# Patient Record
Sex: FEMALE | Race: WHITE | URBAN - METROPOLITAN AREA
[De-identification: names, ages, dates, MRNs, and addresses within clinical notes are randomized per-mention and may not be internally consistent; named-entity substitution may affect disease eponyms.]

---

## 2017-09-13 ENCOUNTER — OFFICE VISIT (OUTPATIENT)
Dept: FAMILY MEDICINE CLINIC | Facility: CLINIC | Age: 27
End: 2017-09-13

## 2017-09-13 ENCOUNTER — APPOINTMENT (OUTPATIENT)
Dept: LAB | Age: 27
End: 2017-09-13
Attending: NURSE PRACTITIONER
Payer: COMMERCIAL

## 2017-09-13 VITALS
TEMPERATURE: 98 F | BODY MASS INDEX: 28.44 KG/M2 | OXYGEN SATURATION: 98 % | DIASTOLIC BLOOD PRESSURE: 82 MMHG | SYSTOLIC BLOOD PRESSURE: 128 MMHG | HEART RATE: 77 BPM | WEIGHT: 130 LBS | HEIGHT: 56.5 IN

## 2017-09-13 DIAGNOSIS — Z23 NEED FOR PROPHYLACTIC VACCINATION AND INOCULATION AGAINST INFLUENZA: ICD-10-CM

## 2017-09-13 DIAGNOSIS — Z00.00 WELLNESS EXAMINATION: Primary | ICD-10-CM

## 2017-09-13 DIAGNOSIS — Z83.3 FAMILY HISTORY OF DIABETES MELLITUS IN FATHER: ICD-10-CM

## 2017-09-13 DIAGNOSIS — R00.2 PALPITATION: ICD-10-CM

## 2017-09-13 DIAGNOSIS — Z13.0 SCREENING FOR IRON DEFICIENCY ANEMIA: ICD-10-CM

## 2017-09-13 DIAGNOSIS — Z13.29 SCREENING FOR THYROID DISORDER: ICD-10-CM

## 2017-09-13 LAB
25-HYDROXYVITAMIN D (TOTAL): 37.8 NG/ML (ref 30–100)
ALBUMIN SERPL-MCNC: 3.8 G/DL (ref 3.5–4.8)
ALP LIVER SERPL-CCNC: 36 U/L (ref 37–98)
ALT SERPL-CCNC: 19 U/L (ref 14–54)
AST SERPL-CCNC: 13 U/L (ref 15–41)
BASOPHILS # BLD AUTO: 0.03 X10(3) UL (ref 0–0.1)
BASOPHILS NFR BLD AUTO: 0.7 %
BILIRUB SERPL-MCNC: 0.5 MG/DL (ref 0.1–2)
BUN BLD-MCNC: 11 MG/DL (ref 8–20)
CALCIUM BLD-MCNC: 9 MG/DL (ref 8.3–10.3)
CHLORIDE: 106 MMOL/L (ref 101–111)
CO2: 29 MMOL/L (ref 22–32)
CREAT BLD-MCNC: 0.85 MG/DL (ref 0.55–1.02)
DEPRECATED HBV CORE AB SER IA-ACNC: 3.4 NG/ML (ref 10–291)
EOSINOPHIL # BLD AUTO: 0.22 X10(3) UL (ref 0–0.3)
EOSINOPHIL NFR BLD AUTO: 5.4 %
ERYTHROCYTE [DISTWIDTH] IN BLOOD BY AUTOMATED COUNT: 13.1 % (ref 11.5–16)
GLUCOSE BLD-MCNC: 97 MG/DL (ref 70–99)
HCT VFR BLD AUTO: 39.3 % (ref 34–50)
HGB BLD-MCNC: 12.5 G/DL (ref 12–16)
IMMATURE GRANULOCYTE COUNT: 0 X10(3) UL (ref 0–1)
IMMATURE GRANULOCYTE RATIO %: 0 %
IRON SATURATION: 10 % (ref 13–45)
IRON: 57 UG/DL (ref 28–170)
LYMPHOCYTES # BLD AUTO: 1.49 X10(3) UL (ref 0.9–4)
LYMPHOCYTES NFR BLD AUTO: 36.7 %
M PROTEIN MFR SERPL ELPH: 7.8 G/DL (ref 6.1–8.3)
MCH RBC QN AUTO: 29.5 PG (ref 27–33.2)
MCHC RBC AUTO-ENTMCNC: 31.8 G/DL (ref 31–37)
MCV RBC AUTO: 92.7 FL (ref 81–100)
MONOCYTES # BLD AUTO: 0.42 X10(3) UL (ref 0.1–0.6)
MONOCYTES NFR BLD AUTO: 10.3 %
NEUTROPHIL ABS PRELIM: 1.9 X10 (3) UL (ref 1.3–6.7)
NEUTROPHILS # BLD AUTO: 1.9 X10(3) UL (ref 1.3–6.7)
NEUTROPHILS NFR BLD AUTO: 46.9 %
PLATELET # BLD AUTO: 250 10(3)UL (ref 150–450)
POTASSIUM SERPL-SCNC: 3.5 MMOL/L (ref 3.6–5.1)
RBC # BLD AUTO: 4.24 X10(6)UL (ref 3.8–5.1)
RED CELL DISTRIBUTION WIDTH-SD: 44 FL (ref 35.1–46.3)
SODIUM SERPL-SCNC: 142 MMOL/L (ref 136–144)
TOTAL IRON BINDING CAPACITY: 580 UG/DL (ref 298–536)
TRANSFERRIN: 389 MG/DL (ref 200–360)
TSI SER-ACNC: 0.65 MIU/ML (ref 0.35–5.5)
WBC # BLD AUTO: 4.1 X10(3) UL (ref 4–13)

## 2017-09-13 PROCEDURE — 99395 PREV VISIT EST AGE 18-39: CPT | Performed by: NURSE PRACTITIONER

## 2017-09-13 PROCEDURE — 83540 ASSAY OF IRON: CPT | Performed by: NURSE PRACTITIONER

## 2017-09-13 PROCEDURE — 82728 ASSAY OF FERRITIN: CPT | Performed by: NURSE PRACTITIONER

## 2017-09-13 PROCEDURE — 36415 COLL VENOUS BLD VENIPUNCTURE: CPT | Performed by: NURSE PRACTITIONER

## 2017-09-13 PROCEDURE — 93000 ELECTROCARDIOGRAM COMPLETE: CPT | Performed by: NURSE PRACTITIONER

## 2017-09-13 PROCEDURE — 90471 IMMUNIZATION ADMIN: CPT | Performed by: NURSE PRACTITIONER

## 2017-09-13 PROCEDURE — 83550 IRON BINDING TEST: CPT | Performed by: NURSE PRACTITIONER

## 2017-09-13 PROCEDURE — 80050 GENERAL HEALTH PANEL: CPT | Performed by: NURSE PRACTITIONER

## 2017-09-13 PROCEDURE — 82306 VITAMIN D 25 HYDROXY: CPT | Performed by: NURSE PRACTITIONER

## 2017-09-13 PROCEDURE — 90686 IIV4 VACC NO PRSV 0.5 ML IM: CPT | Performed by: NURSE PRACTITIONER

## 2017-09-13 PROCEDURE — 86480 TB TEST CELL IMMUN MEASURE: CPT | Performed by: NURSE PRACTITIONER

## 2017-09-13 RX ORDER — ETONOGESTREL AND ETHINYL ESTRADIOL 11.7; 2.7 MG/1; MG/1
INSERT, EXTENDED RELEASE VAGINAL
COMMUNITY

## 2017-09-13 NOTE — PROGRESS NOTES
HPI:    Patient ID: Burak Wilson is a 27year old female. HPI     Present for annual exam. Is concerned that a few weeks ago she noted that she had some palpitations with exercise. Not drinking much caffeine.  Concerned because her dad is diabetic an intact distal pulses. Pulmonary/Chest: Effort normal and breath sounds normal.   Abdominal: Soft. Bowel sounds are normal. There is no hepatosplenomegaly. There is no CVA tenderness.    Genitourinary:   Genitourinary Comments: Sees gyne   Musculoskeletal

## 2017-09-13 NOTE — PATIENT INSTRUCTIONS
EKG - no acute findings. Flu vaccine today. Labs, including testing for TB, pending. If labs normal and symptoms persist, recommend stress test.   Otherwise follow-up as needed.

## 2017-09-17 LAB
M TB TUBERC IFN-G BLD QL: NEGATIVE
M TB TUBERC IFN-G/MITOGEN IGNF BLD: <0 IU/ML
M TB TUBERC IGNF/MITOGEN IGNF CONTROL: >10 IU/ML
MITOGEN IGNF BCKGRD COR BLD-ACNC: 0.08 IU/ML

## 2017-09-18 ENCOUNTER — TELEPHONE (OUTPATIENT)
Dept: FAMILY MEDICINE CLINIC | Facility: CLINIC | Age: 27
End: 2017-09-18

## 2017-09-18 DIAGNOSIS — E87.6 HYPOKALEMIA: ICD-10-CM

## 2017-09-18 DIAGNOSIS — E61.1 IRON DEFICIENCY: Primary | ICD-10-CM

## 2017-09-18 NOTE — TELEPHONE ENCOUNTER
Patient informed of the below results and recommendations. Patient will sign up for my chart for results. She will also c/b to schedule a lab appt in 3 months. Orders pended. Please advise.

## 2017-09-18 NOTE — TELEPHONE ENCOUNTER
----- Message from MANOHAR Magdaleno sent at 9/18/2017  8:42 AM CDT -----  Please let patient know that her TB is negative.   Let patient know that if she signs up for my chart she can download the results or we can print a copy and leave it for her upfr

## 2017-09-21 ENCOUNTER — TELEPHONE (OUTPATIENT)
Dept: FAMILY MEDICINE CLINIC | Facility: CLINIC | Age: 27
End: 2017-09-21

## 2017-09-21 NOTE — TELEPHONE ENCOUNTER
Iron takes up to 6 months to show significant improvement. Recommend echocardiogram here and referral to cardiology in the meantime for stress test.   Please let patient know.    Thank you  Cassandra Guadalupe, 09/21/17, 10:40 AM

## 2017-09-21 NOTE — TELEPHONE ENCOUNTER
RE:   her iron deficiancy       feeling lightheaded and trouble walking up stairs    wondering if there is something else can be done     please advise

## 2017-09-23 NOTE — TELEPHONE ENCOUNTER
Patient states after she placed the call she ended up going to the ER because she had no energy and was laying on the floor at work. States they did another EKG and gave her fluids and told her to f/u with her PCP.   Patient declines having any further khushi

## 2017-09-25 ENCOUNTER — OFFICE VISIT (OUTPATIENT)
Dept: FAMILY MEDICINE CLINIC | Facility: CLINIC | Age: 27
End: 2017-09-25

## 2017-09-25 VITALS
TEMPERATURE: 98 F | HEIGHT: 67 IN | DIASTOLIC BLOOD PRESSURE: 72 MMHG | SYSTOLIC BLOOD PRESSURE: 90 MMHG | WEIGHT: 131.69 LBS | BODY MASS INDEX: 20.67 KG/M2 | RESPIRATION RATE: 16 BRPM | HEART RATE: 96 BPM

## 2017-09-25 DIAGNOSIS — R00.2 PALPITATION: Primary | ICD-10-CM

## 2017-09-25 DIAGNOSIS — E61.1 IRON DEFICIENCY: ICD-10-CM

## 2017-09-25 DIAGNOSIS — E87.6 HYPOKALEMIA: ICD-10-CM

## 2017-09-25 DIAGNOSIS — F41.8 SITUATIONAL ANXIETY: ICD-10-CM

## 2017-09-25 PROCEDURE — 99214 OFFICE O/P EST MOD 30 MIN: CPT | Performed by: NURSE PRACTITIONER

## 2017-09-25 RX ORDER — MELATONIN
325 2 TIMES DAILY WITH MEALS
COMMUNITY

## 2017-09-25 NOTE — PATIENT INSTRUCTIONS
Referral to Dr. Dunia Faust. Continue with fluids. Start mediation. Consider counseling.    Consider sleep study if symptoms persist.

## 2017-09-25 NOTE — PROGRESS NOTES
HPI:    Patient ID: Arsh Arroyo is a 27year old female. HPI     States that she was at work and felt that she was deprived of energy. She laid down and her heart was racing. Felt like she was having trouble breathing. Called the ambulance.  States diagnosis)  Iron deficiency  Hypokalemia  Situational anxiety    No orders of the defined types were placed in this encounter.       Meds This Visit:    No prescriptions requested or ordered in this encounter       Imaging & Referrals:  [facility]ARD[facility] - EXTERNAL

## 2017-12-28 ENCOUNTER — TELEPHONE (OUTPATIENT)
Dept: FAMILY MEDICINE CLINIC | Facility: CLINIC | Age: 27
End: 2017-12-28

## 2017-12-28 NOTE — TELEPHONE ENCOUNTER
Our office protocol is to not prescribe antibiotics over the phone. If she feels that she needs one, then needs to find someone there. Otherwise, fluids, rest and treat symptoms.    Cassandra Guadalupe, 12/28/17, 3:38 PM

## 2017-12-28 NOTE — TELEPHONE ENCOUNTER
Patient states she was in the [facility] for 6 days and now she is in [place]. States she started not feeling well on [place].   Patient complains of a productive cough with yellow mucous, chest congestion, nasal congestion with clear mucus, and feeling weak

## (undated) NOTE — LETTER
Date: 9/13/2017    Patient Name: Bronson Ron          To Whom it may concern:       This letter has been written at the patient's request. The above patient was seen at the [facility] for a physical.     She received a flu vaccine and khushi

## (undated) NOTE — LETTER
01/17/18        [address] [facility]      Dear Deyanira Pike,    [facility] records indicate that you have outstanding lab work and or testing that was ordered for you and has not yet been completed:          Iron And Tibc [E]